# Patient Record
Sex: MALE | Race: WHITE | NOT HISPANIC OR LATINO | URBAN - METROPOLITAN AREA
[De-identification: names, ages, dates, MRNs, and addresses within clinical notes are randomized per-mention and may not be internally consistent; named-entity substitution may affect disease eponyms.]

---

## 2017-06-07 ENCOUNTER — FOLLOW UP ESTABLISHED (OUTPATIENT)
Dept: URBAN - METROPOLITAN AREA CLINIC 30 | Facility: CLINIC | Age: 82
End: 2017-06-07
Payer: MEDICARE

## 2017-06-07 DIAGNOSIS — H35.3131 NEXDTVE AGE-RELATED MCLR DEGN, BILATERAL, EARLY DRY STAGE: ICD-10-CM

## 2017-06-07 PROCEDURE — 92012 INTRM OPH EXAM EST PATIENT: CPT | Performed by: OPHTHALMOLOGY

## 2017-06-07 PROCEDURE — 92083 EXTENDED VISUAL FIELD XM: CPT | Performed by: OPHTHALMOLOGY

## 2017-06-07 ASSESSMENT — INTRAOCULAR PRESSURE
OD: 13
OS: 14

## 2017-12-06 ENCOUNTER — CONSULT (OUTPATIENT)
Dept: URBAN - METROPOLITAN AREA CLINIC 24 | Facility: CLINIC | Age: 82
End: 2017-12-06
Payer: MEDICARE

## 2017-12-06 PROCEDURE — 92250 FUNDUS PHOTOGRAPHY W/I&R: CPT | Performed by: OPHTHALMOLOGY

## 2017-12-06 PROCEDURE — 92014 COMPRE OPH EXAM EST PT 1/>: CPT | Performed by: OPHTHALMOLOGY

## 2017-12-06 PROCEDURE — 92083 EXTENDED VISUAL FIELD XM: CPT | Performed by: OPHTHALMOLOGY

## 2017-12-06 ASSESSMENT — INTRAOCULAR PRESSURE
OD: 12
OS: 12

## 2018-07-10 ENCOUNTER — FOLLOW UP ESTABLISHED (OUTPATIENT)
Dept: URBAN - METROPOLITAN AREA CLINIC 24 | Facility: CLINIC | Age: 83
End: 2018-07-10
Payer: MEDICARE

## 2018-07-10 PROCEDURE — 92012 INTRM OPH EXAM EST PATIENT: CPT | Performed by: OPHTHALMOLOGY

## 2018-07-10 ASSESSMENT — INTRAOCULAR PRESSURE
OS: 26
OD: 20

## 2018-09-11 ENCOUNTER — FOLLOW UP ESTABLISHED (OUTPATIENT)
Dept: URBAN - METROPOLITAN AREA CLINIC 24 | Facility: CLINIC | Age: 83
End: 2018-09-11
Payer: MEDICARE

## 2018-09-11 PROCEDURE — 92012 INTRM OPH EXAM EST PATIENT: CPT | Performed by: OPHTHALMOLOGY

## 2018-09-11 PROCEDURE — 92133 CPTRZD OPH DX IMG PST SGM ON: CPT | Performed by: OPHTHALMOLOGY

## 2018-09-11 ASSESSMENT — INTRAOCULAR PRESSURE
OD: 20
OS: 20

## 2018-09-18 ENCOUNTER — Encounter (OUTPATIENT)
Dept: URBAN - METROPOLITAN AREA CLINIC 24 | Facility: CLINIC | Age: 83
End: 2018-09-18
Payer: MEDICARE

## 2018-09-18 PROCEDURE — 65855 TRABECULOPLASTY LASER SURG: CPT | Performed by: OPHTHALMOLOGY

## 2018-10-02 ENCOUNTER — Encounter (OUTPATIENT)
Dept: URBAN - METROPOLITAN AREA CLINIC 24 | Facility: CLINIC | Age: 83
End: 2018-10-02
Payer: MEDICARE

## 2018-10-02 PROCEDURE — 65855 TRABECULOPLASTY LASER SURG: CPT | Performed by: OPHTHALMOLOGY

## 2019-01-02 ENCOUNTER — FOLLOW UP ESTABLISHED (OUTPATIENT)
Dept: URBAN - METROPOLITAN AREA CLINIC 24 | Facility: CLINIC | Age: 84
End: 2019-01-02
Payer: MEDICARE

## 2019-01-02 PROCEDURE — 92012 INTRM OPH EXAM EST PATIENT: CPT | Performed by: OPHTHALMOLOGY

## 2019-01-02 RX ORDER — DORZOLAMIDE HYDROCHLORIDE AND TIMOLOL MALEATE 20; 5 MG/ML; MG/ML
SOLUTION/ DROPS OPHTHALMIC
Qty: 3 | Refills: 3 | Status: INACTIVE
Start: 2019-01-02 | End: 2020-03-12

## 2019-01-02 RX ORDER — LATANOPROST 50 UG/ML
0.005 % SOLUTION OPHTHALMIC
Qty: 3 | Refills: 3 | Status: INACTIVE
Start: 2019-01-02 | End: 2020-02-12

## 2019-01-02 ASSESSMENT — INTRAOCULAR PRESSURE
OS: 14
OD: 14

## 2019-06-25 ENCOUNTER — FOLLOW UP ESTABLISHED (OUTPATIENT)
Dept: URBAN - METROPOLITAN AREA CLINIC 24 | Facility: CLINIC | Age: 84
End: 2019-06-25
Payer: MEDICARE

## 2019-06-25 PROCEDURE — 92012 INTRM OPH EXAM EST PATIENT: CPT | Performed by: OPHTHALMOLOGY

## 2019-06-25 PROCEDURE — 92083 EXTENDED VISUAL FIELD XM: CPT | Performed by: OPHTHALMOLOGY

## 2019-06-25 ASSESSMENT — INTRAOCULAR PRESSURE
OS: 20
OD: 20

## 2019-08-28 ENCOUNTER — FOLLOW UP ESTABLISHED (OUTPATIENT)
Dept: URBAN - METROPOLITAN AREA CLINIC 24 | Facility: CLINIC | Age: 84
End: 2019-08-28
Payer: MEDICARE

## 2019-08-28 PROCEDURE — 92014 COMPRE OPH EXAM EST PT 1/>: CPT | Performed by: OPHTHALMOLOGY

## 2019-08-28 ASSESSMENT — INTRAOCULAR PRESSURE
OS: 15
OD: 17

## 2019-09-06 ENCOUNTER — Encounter (OUTPATIENT)
Dept: URBAN - METROPOLITAN AREA CLINIC 24 | Facility: CLINIC | Age: 84
End: 2019-09-06
Payer: MEDICARE

## 2019-09-06 PROCEDURE — 65855 TRABECULOPLASTY LASER SURG: CPT | Performed by: OPHTHALMOLOGY

## 2019-09-20 ENCOUNTER — Encounter (OUTPATIENT)
Dept: URBAN - METROPOLITAN AREA CLINIC 24 | Facility: CLINIC | Age: 84
End: 2019-09-20
Payer: MEDICARE

## 2019-09-20 PROCEDURE — 65855 TRABECULOPLASTY LASER SURG: CPT | Performed by: OPHTHALMOLOGY

## 2019-11-01 ENCOUNTER — FOLLOW UP ESTABLISHED (OUTPATIENT)
Dept: URBAN - METROPOLITAN AREA CLINIC 24 | Facility: CLINIC | Age: 84
End: 2019-11-01
Payer: MEDICARE

## 2019-11-01 DIAGNOSIS — H40.1132 PRIMARY OPEN-ANGLE GLAUCOMA, BILATERAL, MODERATE STAGE: Primary | ICD-10-CM

## 2019-11-01 PROCEDURE — 92012 INTRM OPH EXAM EST PATIENT: CPT | Performed by: OPHTHALMOLOGY

## 2019-11-01 ASSESSMENT — INTRAOCULAR PRESSURE
OS: 8
OD: 10

## 2020-03-11 ENCOUNTER — FOLLOW UP ESTABLISHED (OUTPATIENT)
Dept: URBAN - METROPOLITAN AREA CLINIC 24 | Facility: CLINIC | Age: 85
End: 2020-03-11
Payer: MEDICARE

## 2020-03-11 PROCEDURE — 92012 INTRM OPH EXAM EST PATIENT: CPT | Performed by: OPHTHALMOLOGY

## 2020-03-11 PROCEDURE — 92133 CPTRZD OPH DX IMG PST SGM ON: CPT | Performed by: OPHTHALMOLOGY

## 2020-03-11 PROCEDURE — 92250 FUNDUS PHOTOGRAPHY W/I&R: CPT | Performed by: OPHTHALMOLOGY

## 2020-03-11 ASSESSMENT — INTRAOCULAR PRESSURE
OS: 22
OD: 13

## 2020-05-07 ENCOUNTER — FOLLOW UP ESTABLISHED (OUTPATIENT)
Dept: URBAN - METROPOLITAN AREA CLINIC 24 | Facility: CLINIC | Age: 85
End: 2020-05-07
Payer: MEDICARE

## 2020-05-07 PROCEDURE — 92012 INTRM OPH EXAM EST PATIENT: CPT | Performed by: OPHTHALMOLOGY

## 2020-05-07 ASSESSMENT — INTRAOCULAR PRESSURE
OS: 15
OD: 13

## 2020-08-12 ENCOUNTER — FOLLOW UP ESTABLISHED (OUTPATIENT)
Dept: URBAN - METROPOLITAN AREA CLINIC 24 | Facility: CLINIC | Age: 85
End: 2020-08-12
Payer: MEDICARE

## 2020-08-12 PROCEDURE — 92014 COMPRE OPH EXAM EST PT 1/>: CPT | Performed by: OPHTHALMOLOGY

## 2020-08-12 PROCEDURE — 92083 EXTENDED VISUAL FIELD XM: CPT | Performed by: OPHTHALMOLOGY

## 2020-08-12 ASSESSMENT — INTRAOCULAR PRESSURE
OS: 21
OD: 23

## 2020-08-28 ENCOUNTER — SURGERY (OUTPATIENT)
Dept: URBAN - METROPOLITAN AREA SURGERY 12 | Facility: SURGERY | Age: 85
End: 2020-08-28
Payer: MEDICARE

## 2020-08-28 PROCEDURE — 65855 TRABECULOPLASTY LASER SURG: CPT | Performed by: OPHTHALMOLOGY

## 2020-09-11 ENCOUNTER — SURGERY (OUTPATIENT)
Dept: URBAN - METROPOLITAN AREA SURGERY 12 | Facility: SURGERY | Age: 85
End: 2020-09-11
Payer: MEDICARE

## 2020-09-11 PROCEDURE — 65855 TRABECULOPLASTY LASER SURG: CPT | Performed by: OPHTHALMOLOGY

## 2020-12-02 ENCOUNTER — FOLLOW UP ESTABLISHED (OUTPATIENT)
Dept: URBAN - METROPOLITAN AREA CLINIC 24 | Facility: CLINIC | Age: 85
End: 2020-12-02
Payer: MEDICARE

## 2020-12-02 PROCEDURE — 92012 INTRM OPH EXAM EST PATIENT: CPT | Performed by: OPHTHALMOLOGY

## 2020-12-02 ASSESSMENT — INTRAOCULAR PRESSURE
OS: 17
OD: 17

## 2021-04-08 ENCOUNTER — OFFICE VISIT (OUTPATIENT)
Dept: URBAN - METROPOLITAN AREA CLINIC 24 | Facility: CLINIC | Age: 86
End: 2021-04-08
Payer: MEDICARE

## 2021-04-08 PROCEDURE — 99213 OFFICE O/P EST LOW 20 MIN: CPT | Performed by: OPHTHALMOLOGY

## 2021-04-08 ASSESSMENT — INTRAOCULAR PRESSURE
OD: 14
OS: 17

## 2021-04-08 NOTE — IMPRESSION/PLAN
Impression: Primary open-angle glaucoma, moderate stage, bilateral
S/P SLT OD 09/18/18  S/P SLT OS 10/02/19 Plan: Pt has POAG Glaucoma    Gonio :  SS OU (08/28/19) Pachs:[phone] (06/25/19)      Today's IOP :14/17  Tmax 26//23 Target IOP low to mid teens Pt reports mother had glaucoma Right eye is the better seeing eye VF 06/25/19 OD has arc defect and OS has Para central loss Progression C/D: .85-.8 OU with cupping Pt denies Sulfa Allergy   // Pt denies Lung /Heart dx Pt is currently using : Latanoprost OU QPM, Cosopt OU BID No previous medications used Plan :
1. Cont Latanoprost QHS OU Cosopt BID OU 
2. IOP and condition appear stable today. No changes being made to current regimen. Recommend monitoring condition at this time.

## 2021-08-11 ENCOUNTER — OFFICE VISIT (OUTPATIENT)
Dept: URBAN - METROPOLITAN AREA CLINIC 24 | Facility: CLINIC | Age: 86
End: 2021-08-11
Payer: MEDICARE

## 2021-08-11 PROCEDURE — 92083 EXTENDED VISUAL FIELD XM: CPT | Performed by: OPHTHALMOLOGY

## 2021-08-11 PROCEDURE — 99213 OFFICE O/P EST LOW 20 MIN: CPT | Performed by: OPHTHALMOLOGY

## 2021-08-11 ASSESSMENT — INTRAOCULAR PRESSURE
OD: 19
OS: 19

## 2021-08-11 NOTE — IMPRESSION/PLAN
Impression: Primary open-angle glaucoma, moderate stage, bilateral
S/P SLT OD 09/18/18  S/P SLT OS 10/02/19 Plan: Pt has POAG Glaucoma    Gonio :  SS OU (08/28/19) Pachs:[phone] (06/25/19)      Today's IOP :19/19 Pt reports mother had glaucoma Right eye is the better seeing eye VF 8/11/21 OD has arc defect and OS has Para central loss C/D: .85-.8 OU with cupping Pt denies Sulfa Allergy   // Pt denies Lung /Heart dx Plan :
1. Cont Latanoprost QHS OU Cosopt BID OU 
2. IOP and condition appear stable today. No changes being made to current regimen. Recommend monitoring condition at this time. 
3. IF IOP continues to increase will plan for SLT
4. 3 month IOP

## 2021-11-11 ENCOUNTER — OFFICE VISIT (OUTPATIENT)
Dept: URBAN - METROPOLITAN AREA CLINIC 24 | Facility: CLINIC | Age: 86
End: 2021-11-11
Payer: MEDICARE

## 2021-11-11 PROCEDURE — 99213 OFFICE O/P EST LOW 20 MIN: CPT | Performed by: OPHTHALMOLOGY

## 2021-11-11 ASSESSMENT — INTRAOCULAR PRESSURE
OD: 13
OS: 13

## 2021-11-11 NOTE — IMPRESSION/PLAN
Impression: Primary open-angle glaucoma, moderate stage, bilateral
S/P SLT OD 09/18/18  S/P SLT OS 10/02/19 Plan: Pt has POAG Glaucoma    Gonio :  SS OU (08/28/19) Pachs:[phone] (06/25/19)      Today's IOP :13/13 Pt reports mother had glaucoma Right eye is the better seeing eye VF 8/11/21 OD has arc defect and OS has Para central loss C/D: .85-.8 OU with cupping Pt denies Sulfa Allergy   // Pt denies Lung /Heart dx Plan :
1. Cont Latanoprost QHS OU Cosopt BID OU 
2. IOP and condition appear stable today. No changes being made to current regimen. Recommend monitoring condition at this time. 
3. IF IOP continues to increase will plan for SLT
4. 3 month IOP check

## 2021-12-30 ENCOUNTER — OFFICE VISIT (OUTPATIENT)
Dept: URBAN - METROPOLITAN AREA CLINIC 54 | Facility: CLINIC | Age: 86
End: 2021-12-30
Payer: MEDICARE

## 2021-12-30 DIAGNOSIS — Z96.1 PRESENCE OF PSEUDOPHAKIA: ICD-10-CM

## 2021-12-30 PROCEDURE — 92134 CPTRZ OPH DX IMG PST SGM RTA: CPT | Performed by: OPHTHALMOLOGY

## 2021-12-30 PROCEDURE — 99204 OFFICE O/P NEW MOD 45 MIN: CPT | Performed by: OPHTHALMOLOGY

## 2021-12-30 ASSESSMENT — INTRAOCULAR PRESSURE
OD: 13
OS: 16

## 2021-12-30 NOTE — IMPRESSION/PLAN
Impression: Central retinal vein occlusion, left eye, stable: X37.6313. Plan: The diagnosis, natural history, and prognosis of central retinal vein occlusion, were discussed. The associations with macular edema and neovascularization were also discussed. Currently, there is no iris or angle neovascularization identified on examination, and the IOP is within normal limits. Risks, benefits, and alternatives of treatment options including laser, steroids, Eylea, Lucentis and Avastin, as well as the opportunity to participate in a clinical trial were discussed at length. The patient understands that treatment may not improve vision, but should reduce the risk of further visual loss.

## 2021-12-30 NOTE — IMPRESSION/PLAN
Impression: Retinal artery branch occlusion, left eye: H34.232. Left. OCT:
OD: wnl OS: inner retinal edema FA:
OD: WNL
OS: delayed transit through ateriole through superior macula but reconstituted flow. 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 Affecting central macula unfortunately. Symptoms x 5 days. Pt was seen in ED in [place] on Tuesday. Had CT scan. Has history of LP shunt. No evidence of acute stroke per patient. Denies any carotid imaging in ED. Has been started on ASA 81mg. Natural history, treatment options, and prognosis of vascular occlusion discussed. Pt denies any GCA symptoms. Will check labs: CBC, ESR, CRP. Lab request given. Also recommend pt see PCP to get carotid ultrasound ordered. Discussed importance of follow up to evaluate for macular edema/neovascular complications.  

RTC 4 weeks DFE OU OCT OU Re-eval Avastin [facility])

## 2022-01-27 ENCOUNTER — OFFICE VISIT (OUTPATIENT)
Dept: URBAN - METROPOLITAN AREA CLINIC 31 | Facility: CLINIC | Age: 87
End: 2022-01-27
Payer: MEDICARE

## 2022-01-27 DIAGNOSIS — H35.3132 NEXDTVE AGE-RELATED MCLR DEGN, BILATERAL, INTERMED DRY STAGE: ICD-10-CM

## 2022-01-27 DIAGNOSIS — H34.232 RETINAL ARTERY BRANCH OCCLUSION, LEFT EYE: Primary | ICD-10-CM

## 2022-01-27 DIAGNOSIS — H34.8122 CENTRAL RETINAL VEIN OCCLUSION, LEFT EYE, STABLE: ICD-10-CM

## 2022-01-27 PROCEDURE — 92134 CPTRZ OPH DX IMG PST SGM RTA: CPT | Performed by: OPHTHALMOLOGY

## 2022-01-27 PROCEDURE — 99214 OFFICE O/P EST MOD 30 MIN: CPT | Performed by: OPHTHALMOLOGY

## 2022-01-27 ASSESSMENT — INTRAOCULAR PRESSURE
OS: 16
OD: 15

## 2022-01-27 NOTE — IMPRESSION/PLAN
Impression: Nexdtve age-related mclr degn, bilateral, intermed dry stage: H35.3132. Plan: Exam and OCT demonstrate stable drusen and RPE changes. The patient was advised to continue AREDS 2 vitamin supplements; the risk of smoking was emphasized with the patient. The patient was also advised to continue to use an [employer] to monitor the vision and to call immediately with any changes.

## 2022-01-27 NOTE — IMPRESSION/PLAN
Impression: Retinal artery branch occlusion, left eye: H34.232. Left. - Occurred on [place] day, ER visit OCT:
OD: wnl OS: inner retinal edema FA 12/2021:
OD: WNL
OS: delayed transit through ateriole through superior macula but reconstituted flow. 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 Vision has improved since acute event. Labs reviewed and /ESR were WNL. Patient denies any new GCA symptoms. Per patient, carotid and cardiac ultrasound was WNL Discussed r/b/a of antiVEGF vs laser vs observation. Rec observation for now given minimal CME. Warning symptoms discussed.  

RTC 3 mo with OCT OU Re-eval Avastin [facility])

## 2022-04-28 ENCOUNTER — OFFICE VISIT (OUTPATIENT)
Dept: URBAN - METROPOLITAN AREA CLINIC 54 | Facility: CLINIC | Age: 87
End: 2022-04-28
Payer: MEDICARE

## 2022-04-28 DIAGNOSIS — Z96.1 PRESENCE OF INTRAOCULAR LENS: ICD-10-CM

## 2022-04-28 DIAGNOSIS — H34.232 RETINAL ARTERY BRANCH OCCLUSION, LEFT EYE: Primary | ICD-10-CM

## 2022-04-28 DIAGNOSIS — H35.3132 NEXDTVE AGE-RELATED MCLR DEGN, BILATERAL, INTERMED DRY STAGE: ICD-10-CM

## 2022-04-28 DIAGNOSIS — H34.8122 CENTRAL RETINAL VEIN OCCLUSION, LEFT EYE, STABLE: ICD-10-CM

## 2022-04-28 PROCEDURE — 99214 OFFICE O/P EST MOD 30 MIN: CPT | Performed by: OPHTHALMOLOGY

## 2022-04-28 PROCEDURE — 92134 CPTRZ OPH DX IMG PST SGM RTA: CPT | Performed by: OPHTHALMOLOGY

## 2022-04-28 ASSESSMENT — INTRAOCULAR PRESSURE
OS: 15
OD: 14

## 2022-04-28 NOTE — IMPRESSION/PLAN
Impression: Nexdtve age-related mclr degn, bilateral, intermed dry stage: H35.3132. Plan: Stable upon examination. The patient was advised to continue ARED's. Smoking cessation was advised. The patient was also advised to continue to monitor AG and VA and call immediately with any changes.

## 2022-04-28 NOTE — IMPRESSION/PLAN
Impression: Retinal artery branch occlusion, left eye: H34.232. Left. - Occurred on [place] day, ER visit OCT: 04/28/22 OD: wnl OS: atrophy FA 12/2021:
OD: WNL
OS: delayed transit through ateriole through superior macula but reconstituted flow. 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 Vision has improved since acute event. Labs reviewed and /ESR were WNL. Patient denies any new GCA symptoms. Per patient, carotid and cardiac ultrasound was WNL Discussed r/b/a of antiVEGF vs laser vs observation. Rec observation for now given minimal CME. Warning symptoms discussed.  

RTC 6 mo with OCT OU Re-eval Avastin

## 2022-04-29 ENCOUNTER — OFFICE VISIT (OUTPATIENT)
Dept: URBAN - METROPOLITAN AREA CLINIC 44 | Facility: CLINIC | Age: 87
End: 2022-04-29
Payer: MEDICARE

## 2022-04-29 DIAGNOSIS — H40.1132 PRIMARY OPEN-ANGLE GLAUCOMA, BILATERAL, MODERATE STAGE: Primary | ICD-10-CM

## 2022-04-29 PROCEDURE — 99214 OFFICE O/P EST MOD 30 MIN: CPT | Performed by: OPHTHALMOLOGY

## 2022-04-29 PROCEDURE — 92250 FUNDUS PHOTOGRAPHY W/I&R: CPT | Performed by: OPHTHALMOLOGY

## 2022-04-29 RX ORDER — LATANOPROST 50 UG/ML
0.005 % SOLUTION OPHTHALMIC
Qty: 7.5 | Refills: 3 | Status: ACTIVE
Start: 2022-04-29

## 2022-04-29 RX ORDER — DORZOLAMIDE HYDROCHLORIDE AND TIMOLOL MALEATE 20; 5 MG/ML; MG/ML
SOLUTION/ DROPS OPHTHALMIC
Qty: 30 | Refills: 3 | Status: ACTIVE
Start: 2022-04-29

## 2022-04-29 ASSESSMENT — INTRAOCULAR PRESSURE
OD: 20
OS: 24

## 2022-04-29 NOTE — IMPRESSION/PLAN
Impression: Primary open-angle glaucoma, moderate stage, bilateral Plan: PT HAS POAG  OU                     GONIO : SS OU (12/6/17)         PACHS: [phone] (06/25/19) S/P SLT OD 09/18/18 // S/P SLT OS 10/02/18 PT DENIES SULFA ALLERGY
PT DENIES LUNG DZ
TARGET IOP LOW TEENS OR LESS
RECOMMEND 1. RE-START COSOPT OU BID (RE-START 4/29/22) 2. CONTINUE LATANOPROST OU QPM 
3. OFFERED PT OPTION OF SLT OU, PT DEFERS 4/29/22 4.  F/U 4-6 MONTHS

## 2022-10-28 ENCOUNTER — OFFICE VISIT (OUTPATIENT)
Dept: URBAN - METROPOLITAN AREA CLINIC 44 | Facility: CLINIC | Age: 87
End: 2022-10-28
Payer: MEDICARE

## 2022-10-28 DIAGNOSIS — H40.1132 PRIMARY OPEN-ANGLE GLAUCOMA, BILATERAL, MODERATE STAGE: Primary | ICD-10-CM

## 2022-10-28 PROCEDURE — 99213 OFFICE O/P EST LOW 20 MIN: CPT | Performed by: OPHTHALMOLOGY

## 2022-10-28 ASSESSMENT — INTRAOCULAR PRESSURE
OD: 20
OS: 23

## 2022-10-28 NOTE — IMPRESSION/PLAN
Impression: Primary open-angle glaucoma, moderate stage, bilateral Plan: PT HAS POAG  OU                     GONIO : SS OU (12/6/17)         PACHS: [phone] (06/25/19) S/P BRAO UNDER CARE OF DR Avery Pardo
S/P SLT OD 09/18/18 // S/P SLT OS 10/02/18 PT DENIES SULFA ALLERGY
PT DENIES LUNG DZ
TARGET IOP LOW TEENS OR LESS
RECOMMEND 1. CONTINUE COSOPT OU BID (RE-STARTED 4/29/22) 2. CONTINUE LATANOPROST OU QPM 
3. OFFERED PT OPTION OF SLT OU, PT WISHES TO PROCEED 10/28/22 4. THE PT IS AWARE THAT HIS IOP IS SUB-OPTIMAL IN EACH EYE AND THAT FAILURE TO LOWER IOP WILL LEAVE HIM AT RISK FOR PROGRESSIVE GLAUCOMATOUS SIGHT LOSS 5.  SCHEDULE SLT OD THEN OS

## 2022-11-22 ENCOUNTER — OFFICE VISIT (OUTPATIENT)
Dept: URBAN - METROPOLITAN AREA CLINIC 13 | Facility: CLINIC | Age: 87
End: 2022-11-22
Payer: MEDICARE

## 2022-11-22 DIAGNOSIS — H34.8122 CENTRAL RETINAL VEIN OCCLUSION, LEFT EYE, STABLE: ICD-10-CM

## 2022-11-22 DIAGNOSIS — H35.3132 NEXDTVE AGE-RELATED MCLR DEGN, BILATERAL, INTERMED DRY STAGE: ICD-10-CM

## 2022-11-22 DIAGNOSIS — H34.232 RETINAL ARTERY BRANCH OCCLUSION, LEFT EYE: Primary | ICD-10-CM

## 2022-11-22 PROCEDURE — 92134 CPTRZ OPH DX IMG PST SGM RTA: CPT | Performed by: OPHTHALMOLOGY

## 2022-11-22 PROCEDURE — 99214 OFFICE O/P EST MOD 30 MIN: CPT | Performed by: OPHTHALMOLOGY

## 2022-11-22 ASSESSMENT — INTRAOCULAR PRESSURE
OS: 12
OD: 13

## 2022-11-22 NOTE — IMPRESSION/PLAN
Impression: Retinal artery branch occlusion, left eye: H34.232. Left. - Occurred on [place] day, ER visit OCT: 11/22/2022 OD: pseudovit material
OS: atrophy FA 12/2021:
OD: WNL
OS: delayed transit through ateriole through superior macula but reconstituted flow. 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 Vision has improved since acute event. Labs reviewed and /ESR were WNL. Patient denies any new GCA symptoms. Per patient, carotid and cardiac ultrasound was WNL Discussed r/b/a of antiVEGF vs laser vs observation. Rec observation for now given minimal CME. Warning symptoms discussed.  

RTC 12 mo with OCT OU Re-eval Avastin

## 2022-12-28 ENCOUNTER — OFFICE VISIT (OUTPATIENT)
Dept: URBAN - METROPOLITAN AREA CLINIC 44 | Facility: CLINIC | Age: 87
End: 2022-12-28
Payer: MEDICARE

## 2022-12-28 DIAGNOSIS — H40.1132 PRIMARY OPEN-ANGLE GLAUCOMA, BILATERAL, MODERATE STAGE: Primary | ICD-10-CM

## 2022-12-28 PROCEDURE — 99213 OFFICE O/P EST LOW 20 MIN: CPT | Performed by: OPHTHALMOLOGY

## 2022-12-28 ASSESSMENT — INTRAOCULAR PRESSURE
OS: 12
OD: 13

## 2022-12-28 NOTE — IMPRESSION/PLAN
Impression: Primary open-angle glaucoma, moderate stage, bilateral Plan: PT HAS POAG  OU                     GONIO : SS OU (12/6/17)         PACHS: [phone] (06/25/19) S/P BRAO UNDER CARE OF DR Rancho Watson
S/P SLT OU OD 11/04/22, S/P SLT OS 11/11/22 PT DENIES SULFA ALLERGY
PT DENIES LUNG DZ
TARGET IOP LOW TEENS OR LESS
RECOMMEND 1. CONTINUE COSOPT OU BID (RE-STARTED 4/29/22) 2. CONTINUE LATANOPROST OU QPM 
3. S/P SLT AND THE PT IS DOING WELL
VISUAL FIELD 04/06/21  RNFL 08/11/2022    OPTOS 04/29/2022
4.  F/U IOP CHECK AND OPTOS 6 MONTHS WITH VISUAL FIELD

## 2023-05-08 ENCOUNTER — OFFICE VISIT (OUTPATIENT)
Dept: URBAN - METROPOLITAN AREA CLINIC 44 | Facility: CLINIC | Age: 88
End: 2023-05-08
Payer: MEDICARE

## 2023-05-08 DIAGNOSIS — Z96.1 PRESENCE OF INTRAOCULAR LENS: ICD-10-CM

## 2023-05-08 DIAGNOSIS — H35.3132 NEXDTVE AGE-RELATED MCLR DEGN, BILATERAL, INTERMED DRY STAGE: ICD-10-CM

## 2023-05-08 DIAGNOSIS — H34.232 RETINAL ARTERY BRANCH OCCLUSION, LEFT EYE: ICD-10-CM

## 2023-05-08 DIAGNOSIS — H40.1132 PRIMARY OPEN-ANGLE GLAUCOMA, BILATERAL, MODERATE STAGE: Primary | ICD-10-CM

## 2023-05-08 DIAGNOSIS — H52.223 REGULAR ASTIGMATISM, BILATERAL: ICD-10-CM

## 2023-05-08 DIAGNOSIS — H34.8122 CENTRAL RETINAL VEIN OCCLUSION, LEFT EYE, STABLE: ICD-10-CM

## 2023-05-08 PROCEDURE — 92134 CPTRZ OPH DX IMG PST SGM RTA: CPT | Performed by: OPTOMETRIST

## 2023-05-08 PROCEDURE — 99204 OFFICE O/P NEW MOD 45 MIN: CPT | Performed by: OPTOMETRIST

## 2023-05-08 RX ORDER — DORZOLAMIDE HYDROCHLORIDE AND TIMOLOL MALEATE 20; 5 MG/ML; MG/ML
SOLUTION/ DROPS OPHTHALMIC
Qty: 30 | Refills: 3 | Status: ACTIVE
Start: 2023-05-08

## 2023-05-08 RX ORDER — LATANOPROST 50 UG/ML
0.005 % SOLUTION OPHTHALMIC
Qty: 7.5 | Refills: 3 | Status: ACTIVE
Start: 2023-05-08

## 2023-05-08 ASSESSMENT — KERATOMETRY
OS: 43.00
OD: 43.00

## 2023-05-08 ASSESSMENT — INTRAOCULAR PRESSURE
OS: 14
OD: 13

## 2023-05-08 ASSESSMENT — VISUAL ACUITY
OD: 20/50
OS: CF 5FT

## 2023-05-08 NOTE — IMPRESSION/PLAN
Impression: Nexdtve age-related mclr degn, bilateral, intermed dry stage: H35.3132. Plan: PLAN: Rec lifestyle changes to decrease risk of AMD progression (Such as diet, cessation of smoking, exercise and sunglasses) Rec AREDS 2 with low Zinc.. Home monitoring with [employer]ler grid.  RTC with any changes in vision, otherwise RTC a sched w Dr Theodora Whitman

## 2023-05-08 NOTE — IMPRESSION/PLAN
Impression: Regular astigmatism, bilateral: H52.223. Plan: Updated RX.   Rec impact resistant lens material.

## 2023-05-08 NOTE — IMPRESSION/PLAN
Impression: Primary open-angle glaucoma, moderate stage, bilateral
=IOP OD 13, OS 14.
-Vertical cupping OD .62, OS .72. OD with RNFL loss (S), Average 70). OS with RNFL loss (S,I), Average 63) -VF OD with inferior arcuate and nasal step defects (VFI 89% 8/21), 
* OS with advance field loss sparing the center 15% (VFI 15% 8/21) -S/P LPI patent OU
-Hx of BRAO, CRVO OS 
-PT DENIES SULFA ALLERGY
 PT DENIES LUNG DZ Plan: PLAN: 
1. CONTINUE COSOPT OU BID (RE-STARTED 4/29/22) 2. CONTINUE LATANOPROST OU QPM 
3.  RTC with Dr Luis Armando Baer as scheduled

## 2023-05-08 NOTE — IMPRESSION/PLAN
Impression: Retinal artery branch occlusion, left eye: H34.232. Left. - Occurred on [place] day, ER visit. Stable. Macular thinning per OCT. Plan: Plan: Observe. RTC as scheduled with Dr Chester Griggs.

## 2023-11-03 ENCOUNTER — OFFICE VISIT (OUTPATIENT)
Dept: URBAN - METROPOLITAN AREA CLINIC 44 | Facility: CLINIC | Age: 88
End: 2023-11-03
Payer: MEDICARE

## 2023-11-03 DIAGNOSIS — H40.1132 PRIMARY OPEN-ANGLE GLAUCOMA, BILATERAL, MODERATE STAGE: Primary | ICD-10-CM

## 2023-11-03 PROCEDURE — 99213 OFFICE O/P EST LOW 20 MIN: CPT | Performed by: OPHTHALMOLOGY

## 2023-11-03 ASSESSMENT — INTRAOCULAR PRESSURE
OD: 14
OS: 15